# Patient Record
Sex: MALE | NOT HISPANIC OR LATINO | Employment: FULL TIME | URBAN - METROPOLITAN AREA
[De-identification: names, ages, dates, MRNs, and addresses within clinical notes are randomized per-mention and may not be internally consistent; named-entity substitution may affect disease eponyms.]

---

## 2022-05-09 ENCOUNTER — APPOINTMENT (OUTPATIENT)
Dept: GENERAL RADIOLOGY | Facility: CLINIC | Age: 42
End: 2022-05-09
Attending: EMERGENCY MEDICINE
Payer: COMMERCIAL

## 2022-05-09 ENCOUNTER — HOSPITAL ENCOUNTER (EMERGENCY)
Facility: CLINIC | Age: 42
Discharge: HOME | End: 2022-05-09
Attending: EMERGENCY MEDICINE | Admitting: EMERGENCY MEDICINE
Payer: COMMERCIAL

## 2022-05-09 VITALS
OXYGEN SATURATION: 95 % | HEIGHT: 70 IN | HEART RATE: 86 BPM | DIASTOLIC BLOOD PRESSURE: 98 MMHG | BODY MASS INDEX: 42.45 KG/M2 | TEMPERATURE: 98.3 F | RESPIRATION RATE: 18 BRPM | WEIGHT: 296.5 LBS | SYSTOLIC BLOOD PRESSURE: 130 MMHG

## 2022-05-09 DIAGNOSIS — M10.9 ACUTE GOUTY ARTHRITIS: ICD-10-CM

## 2022-05-09 PROCEDURE — 99284 EMERGENCY DEPT VISIT MOD MDM: CPT | Performed by: EMERGENCY MEDICINE

## 2022-05-09 PROCEDURE — 250N000013 HC RX MED GY IP 250 OP 250 PS 637: Performed by: EMERGENCY MEDICINE

## 2022-05-09 PROCEDURE — 73140 X-RAY EXAM OF FINGER(S): CPT | Mod: RT

## 2022-05-09 RX ORDER — PREDNISONE 20 MG/1
TABLET ORAL
Qty: 13 TABLET | Refills: 0 | Status: SHIPPED | OUTPATIENT
Start: 2022-05-09 | End: 2022-05-17

## 2022-05-09 RX ORDER — OXYCODONE AND ACETAMINOPHEN 5; 325 MG/1; MG/1
1 TABLET ORAL EVERY 6 HOURS PRN
Qty: 12 TABLET | Refills: 0 | Status: SHIPPED | OUTPATIENT
Start: 2022-05-09 | End: 2022-05-12

## 2022-05-09 RX ORDER — OXYCODONE AND ACETAMINOPHEN 5; 325 MG/1; MG/1
1 TABLET ORAL ONCE
Status: COMPLETED | OUTPATIENT
Start: 2022-05-09 | End: 2022-05-09

## 2022-05-09 RX ADMIN — OXYCODONE HYDROCHLORIDE AND ACETAMINOPHEN 1 TABLET: 5; 325 TABLET ORAL at 17:44

## 2022-05-09 NOTE — DISCHARGE INSTRUCTIONS
Most likely this is gout as discussed.  On-call orthopedics physician also did not recommend draining this.  He agrees an anti-inflammatory such as prednisone or the next choice to treat this.  If you do not see any improvement please follow-up with primary care.  Do not take ibuprofen Aleve or indomethacin while you are taking the prednisone as this would cause gastric erosions and bleeding.  Follow-up on Thursday with your doctor as planned.  I hope you get better quickly.  Do not drive if you are taking the pain pills.

## 2022-05-09 NOTE — ED TRIAGE NOTES
Pt reports swelling in right 5th finger for the past couple day that has been getting increasingly worse.      Triage Assessment     Row Name 05/09/22 9301       Triage Assessment (Adult)    Airway WDL WDL       Respiratory WDL    Respiratory WDL WDL       Cardiac WDL    Cardiac WDL WDL       Cognitive/Neuro/Behavioral WDL    Cognitive/Neuro/Behavioral WDL WDL

## 2022-05-09 NOTE — ED PROVIDER NOTES
"  History     Chief Complaint   Patient presents with     Hand Pain     HPI  Matt Espinal is a 41 year old male who presents to the emergency department secondary to a swollen painful right fifth finger.  He has a history of gout.  He has not drink any alcohol in a month.  Last limited alcohol was 1 beer a month ago.  Previously he had multiple flareups of gout when he was drinking more heavily.  He has had in his left great toe, his right fifth finger in the same location at the PIP joint and elsewhere.  He tried indomethacin without relief.  He is not currently taking allopurinol.  He is interested in having this finger joint drained.  He feels like it would help.  The pain is severe.  Is difficult for him to bend his finger at all.  He tried some leftover pain medicines he had at home without success.  He thinks it might be an oxycodone but he is not sure.    Allergies:  No Known Allergies    Problem List:    There are no problems to display for this patient.       Past Medical History:    No past medical history on file.    Past Surgical History:    No past surgical history on file.    Family History:    No family history on file.    Social History:  Marital Status:   [2]  Social History     Tobacco Use     Smoking status: Former Smoker     Smokeless tobacco: Never Used   Substance Use Topics     Drug use: Never        Medications:    oxyCODONE-acetaminophen (PERCOCET) 5-325 MG tablet  predniSONE (DELTASONE) 20 MG tablet          Review of Systems   All other systems reviewed and are negative.      Physical Exam   BP: (!) 130/98  Pulse: 92  Temp: 98.3  F (36.8  C)  Resp: 20  Height: 177.8 cm (5' 10\")  Weight: 134.5 kg (296 lb 8 oz)  SpO2: 95 %      Physical Exam  Vitals and nursing note reviewed.   Constitutional:       General: He is not in acute distress.     Appearance: He is well-developed. He is not diaphoretic.   HENT:      Head: Normocephalic and atraumatic.   Eyes:      General: No scleral " icterus.  Musculoskeletal:      Cervical back: Normal range of motion and neck supple.      Comments: Warm, erythematous, tender swollen joint at the PIP joint on the right hand.  There is a bumpy nature to the swelling consistent with gout.   Skin:     General: Skin is warm and dry.      Findings: No rash.   Neurological:      Mental Status: He is alert and oriented to person, place, and time.             ED Course                 Procedures                  Results for orders placed or performed during the hospital encounter of 05/09/22 (from the past 24 hour(s))   XR Finger Right G/E 2 Views    Narrative    EXAM: XR FINGER RT G/E 2 VW  LOCATION: [facility]  DATE/TIME: 5/9/2022 5:43 PM    INDICATION: Finger pain, gout.  COMPARISON: None.      Impression    IMPRESSION: Arthritis of the PIP joint of the little finger with joint space narrowing, well-defined bony lucencies about the joint probably related to extra-articular erosions and soft tissue tissue swelling, compatible with gout. Flexion at the PIP   joint.         Medications   oxyCODONE-acetaminophen (PERCOCET) 5-325 MG per tablet 1 tablet (1 tablet Oral Given 5/9/22 1744)       Assessments & Plan (with Medical Decision Making)  41-year-old male with swollen right fifth finger consistent with gout.  He does have a previous history of gout.  I think this is less likely to be infectious given his history of gout and the fact he has had gout in this location before.  I discussed options with him and decided to give him 1 tablet of Percocet.  An x-ray was performed.  I discussed the case with on-call orthopedics, Dr. Vieira who also did not recommend draining this joint.  He recommended indomethacin and/or prednisone.     I have reviewed the nursing notes.    I have reviewed the findings, diagnosis, plan and need for follow up with the patient.      New Prescriptions    OXYCODONE-ACETAMINOPHEN (PERCOCET) 5-325 MG TABLET    Take 1  tablet by mouth every 6 hours as needed for pain    PREDNISONE (DELTASONE) 20 MG TABLET    Take 3 tablets (60 mg) by mouth daily for 2 days, THEN 2 tablets (40 mg) daily for 2 days, THEN 1 tablet (20 mg) daily for 2 days, THEN 0.5 tablets (10 mg) daily for 2 days.       Final diagnoses:   Acute gouty arthritis       5/9/2022   [facility] EMERGENCY DEPT     Moisés Horner MD  05/09/22 9896